# Patient Record
Sex: FEMALE | Race: WHITE | Employment: OTHER | URBAN - METROPOLITAN AREA
[De-identification: names, ages, dates, MRNs, and addresses within clinical notes are randomized per-mention and may not be internally consistent; named-entity substitution may affect disease eponyms.]

---

## 2020-11-12 RX ORDER — CONJUGATED ESTROGENS AND MEDROXYPROGESTERONE ACETATE .625; 2.5 MG/1; MG/1
TABLET, SUGAR COATED ORAL
Qty: 28 TAB | Refills: 0 | Status: SHIPPED | OUTPATIENT
Start: 2020-11-12 | End: 2020-12-03 | Stop reason: SDUPTHER

## 2020-11-27 VITALS
WEIGHT: 154.4 LBS | DIASTOLIC BLOOD PRESSURE: 72 MMHG | HEIGHT: 63 IN | SYSTOLIC BLOOD PRESSURE: 138 MMHG | BODY MASS INDEX: 27.36 KG/M2

## 2020-11-27 PROBLEM — M81.0 OSTEOPOROSIS: Status: ACTIVE | Noted: 2020-11-27

## 2020-11-27 PROBLEM — M85.80 OSTEOPENIA: Status: ACTIVE | Noted: 2020-11-27

## 2020-11-27 PROBLEM — Z78.0 MENOPAUSE: Status: ACTIVE | Noted: 2020-11-27

## 2020-11-27 RX ORDER — ASPIRIN 81 MG/1
TABLET ORAL DAILY
COMMUNITY

## 2020-12-03 ENCOUNTER — OFFICE VISIT (OUTPATIENT)
Dept: OBGYN CLINIC | Age: 70
End: 2020-12-03
Payer: MEDICARE

## 2020-12-03 VITALS — BODY MASS INDEX: 27.79 KG/M2 | HEIGHT: 63 IN

## 2020-12-03 VITALS
HEIGHT: 63 IN | DIASTOLIC BLOOD PRESSURE: 74 MMHG | SYSTOLIC BLOOD PRESSURE: 130 MMHG | WEIGHT: 157 LBS | BODY MASS INDEX: 27.82 KG/M2

## 2020-12-03 DIAGNOSIS — N95.1 MENOPAUSAL SYNDROME: ICD-10-CM

## 2020-12-03 DIAGNOSIS — Z12.4 SCREENING FOR MALIGNANT NEOPLASM OF THE CERVIX: Primary | ICD-10-CM

## 2020-12-03 PROCEDURE — G8419 CALC BMI OUT NRM PARAM NOF/U: HCPCS | Performed by: OBSTETRICS & GYNECOLOGY

## 2020-12-03 PROCEDURE — G0101 CA SCREEN;PELVIC/BREAST EXAM: HCPCS | Performed by: OBSTETRICS & GYNECOLOGY

## 2020-12-03 PROCEDURE — G8427 DOCREV CUR MEDS BY ELIG CLIN: HCPCS | Performed by: OBSTETRICS & GYNECOLOGY

## 2020-12-03 PROCEDURE — G8510 SCR DEP NEG, NO PLAN REQD: HCPCS | Performed by: OBSTETRICS & GYNECOLOGY

## 2020-12-03 PROCEDURE — G8536 NO DOC ELDER MAL SCRN: HCPCS | Performed by: OBSTETRICS & GYNECOLOGY

## 2020-12-03 RX ORDER — CONJUGATED ESTROGENS AND MEDROXYPROGESTERONE ACETATE .625; 2.5 MG/1; MG/1
1 TABLET, SUGAR COATED ORAL DAILY
Qty: 90 TAB | Refills: 3 | Status: SHIPPED | OUTPATIENT
Start: 2020-12-03 | End: 2021-12-06 | Stop reason: SDUPTHER

## 2020-12-03 NOTE — PROGRESS NOTES
Bailey Alberto is a No obstetric history on file. , 65 y.o. female   No LMP recorded. Patient is perimenopausal.    She presents for her annual    She is having no significant problems. Menstrual status:  Her periods are: menopause. Cycles are: menopause. She does not have dysmenorrhea. Medical conditions:  Since her last annual GYN exam about one year ago, she has not the following changes in her health history: none. Past Medical History:   Diagnosis Date    Menopause 11/27/2020    Obesity     Osteopenia 11/27/2020    Osteoporosis 11/27/2020     Past Surgical History:   Procedure Laterality Date    HX APPENDECTOMY         Prior to Admission medications    Medication Sig Start Date End Date Taking? Authorizing Provider   estrogen, conjugated,-medroxyPROGESTERone (Prempro) 0.625-2.5 mg per tablet Take 1 Tab by mouth daily. 12/3/20  Yes Erwin Manning MD   aspirin delayed-release 81 mg tablet Take  by mouth daily. Yes Provider, Historical   calcium carbonate (CALCIUM 500 PO) Take  by mouth. Yes Provider, Historical   cholecalciferol, vitamin D3, (VITAMIN D3 PO) Take  by mouth. Yes Provider, Historical       Allergies   Allergen Reactions    Penicillins Rash          Tobacco History:  reports that she has never smoked. She has never used smokeless tobacco.  Alcohol Abuse:  has no history on file for alcohol. Drug Abuse:  reports no history of drug use. Family Medical/Cancer History:   Family History   Problem Relation Age of Onset    Diabetes Other     Colon Cancer Other           Review of Systems   Constitutional: Negative for chills, fever, malaise/fatigue and weight loss. HENT: Negative for congestion, ear pain, sinus pain and tinnitus. Eyes: Negative for blurred vision and double vision. Respiratory: Negative for cough, shortness of breath and wheezing. Cardiovascular: Negative for chest pain and palpitations.    Gastrointestinal: Negative for abdominal pain, blood in stool, constipation, diarrhea, heartburn, nausea and vomiting. Genitourinary: Negative for dysuria, flank pain, frequency, hematuria and urgency. Musculoskeletal: Negative for joint pain and myalgias. Skin: Negative for itching and rash. Neurological: Negative for dizziness, weakness and headaches. Psychiatric/Behavioral: Negative for depression, memory loss and suicidal ideas. The patient is not nervous/anxious and does not have insomnia. All other systems reviewed and are negative. Physical Exam  Constitutional:       Appearance: Normal appearance. HENT:      Head: Normocephalic and atraumatic. Cardiovascular:      Rate and Rhythm: Normal rate. Heart sounds: Normal heart sounds. Pulmonary:      Effort: Pulmonary effort is normal.      Breath sounds: Normal breath sounds. Chest:      Breasts:         Right: Normal.         Left: Normal.   Abdominal:      General: Abdomen is flat. Palpations: Abdomen is soft. Genitourinary:     General: Normal vulva. Vagina: Normal.      Cervix: Normal.      Uterus: Normal.       Adnexa: Right adnexa normal and left adnexa normal.      Rectum: Normal.      Comments: PAP Obtained  Neurological:      Mental Status: She is alert. Psychiatric:         Mood and Affect: Mood normal.         Behavior: Behavior normal.         Thought Content: Thought content normal.          Visit Vitals  /74 (BP 1 Location: Right arm, BP Patient Position: Sitting)   Ht 5' 2.5\" (1.588 m)   Wt 157 lb (71.2 kg)   BMI 28.26 kg/m²         Assessment:  Diagnoses and all orders for this visit:    1. Screening for malignant neoplasm of the cervix  -     PAP, LB, RFX HPV QMXMH([id number])    2. Menopausal syndrome    Other orders  -     estrogen, conjugated,-medroxyPROGESTERone (Prempro) 0.625-2.5 mg per tablet; Take 1 Tab by mouth daily.         Plan:Questions addressed  Counseled re: diet, exercise, healthy lifestyle  Return for Annual  Rec annual mammogram        Follow-up and Dispositions    · Return for Mammogram, 1 yr annual, 1 yr mammo.

## 2020-12-09 LAB
CYTOLOGIST CVX/VAG CYTO: NORMAL
CYTOLOGY CVX/VAG DOC CYTO: NORMAL
DX ICD CODE: NORMAL
LABCORP, 190119: NORMAL
Lab: NORMAL
Lab: NORMAL
OTHER STN SPEC: NORMAL
STAT OF ADQ CVX/VAG CYTO-IMP: NORMAL

## 2021-03-22 ENCOUNTER — OFFICE VISIT (OUTPATIENT)
Dept: OBGYN CLINIC | Age: 71
End: 2021-03-22
Payer: MEDICARE

## 2021-03-22 DIAGNOSIS — Z12.31 VISIT FOR SCREENING MAMMOGRAM: Primary | ICD-10-CM

## 2021-03-22 PROCEDURE — 77067 SCR MAMMO BI INCL CAD: CPT | Performed by: OBSTETRICS & GYNECOLOGY

## 2021-12-06 ENCOUNTER — OFFICE VISIT (OUTPATIENT)
Dept: OBGYN CLINIC | Age: 71
End: 2021-12-06
Payer: MEDICARE

## 2021-12-06 VITALS
WEIGHT: 158 LBS | SYSTOLIC BLOOD PRESSURE: 144 MMHG | HEIGHT: 63 IN | DIASTOLIC BLOOD PRESSURE: 72 MMHG | BODY MASS INDEX: 28 KG/M2

## 2021-12-06 DIAGNOSIS — N95.1 MENOPAUSAL SYNDROME: Primary | ICD-10-CM

## 2021-12-06 PROCEDURE — 1090F PRES/ABSN URINE INCON ASSESS: CPT | Performed by: OBSTETRICS & GYNECOLOGY

## 2021-12-06 PROCEDURE — 3017F COLORECTAL CA SCREEN DOC REV: CPT | Performed by: OBSTETRICS & GYNECOLOGY

## 2021-12-06 PROCEDURE — G8427 DOCREV CUR MEDS BY ELIG CLIN: HCPCS | Performed by: OBSTETRICS & GYNECOLOGY

## 2021-12-06 PROCEDURE — 1101F PT FALLS ASSESS-DOCD LE1/YR: CPT | Performed by: OBSTETRICS & GYNECOLOGY

## 2021-12-06 PROCEDURE — 99213 OFFICE O/P EST LOW 20 MIN: CPT | Performed by: OBSTETRICS & GYNECOLOGY

## 2021-12-06 PROCEDURE — G8419 CALC BMI OUT NRM PARAM NOF/U: HCPCS | Performed by: OBSTETRICS & GYNECOLOGY

## 2021-12-06 PROCEDURE — G8536 NO DOC ELDER MAL SCRN: HCPCS | Performed by: OBSTETRICS & GYNECOLOGY

## 2021-12-06 PROCEDURE — G8432 DEP SCR NOT DOC, RNG: HCPCS | Performed by: OBSTETRICS & GYNECOLOGY

## 2021-12-06 PROCEDURE — G9899 SCRN MAM PERF RSLTS DOC: HCPCS | Performed by: OBSTETRICS & GYNECOLOGY

## 2021-12-06 RX ORDER — CONJUGATED ESTROGENS AND MEDROXYPROGESTERONE ACETATE .625; 2.5 MG/1; MG/1
1 TABLET, SUGAR COATED ORAL DAILY
Qty: 90 TABLET | Refills: 3 | Status: SHIPPED | OUTPATIENT
Start: 2021-12-06 | End: 2021-12-26

## 2021-12-06 NOTE — PROGRESS NOTES
Chief Complaint   Patient presents with    Medication Check     mammo-3-2021     Visit Vitals  BP (!) 144/72 (BP 1 Location: Left upper arm, BP Patient Position: Sitting, BP Cuff Size: Small adult)   Ht 5' 2.5\" (1.588 m)   Wt 158 lb (71.7 kg)   BMI 28.44 kg/m²

## 2021-12-06 NOTE — PROGRESS NOTES
Jose Jiménez is a No obstetric history on file. , 66 y.o. female   No LMP recorded. (Menstrual status: Menopause). She presents for her problem    She is having no significant problems. doing well on HRT      Menstrual status:  Cycles are menopausal.    Flow: absent. She does not have dysmenorrhea. Medical conditions:  Since her last annual GYN exam about one year ago, she has not the following changes in her health history: none. Mammogram History:    UNRULY Results (most recent):  Results from Office Visit encounter on 03/22/21    [facility] MAMMO BI SCREENING INCL CAD       DEXA Results (most recent):  No results found for this or any previous visit. Past Medical History:   Diagnosis Date    Menopause 11/27/2020    Obesity     Osteopenia 11/27/2020    Osteoporosis 11/27/2020     Past Surgical History:   Procedure Laterality Date    HX APPENDECTOMY         Prior to Admission medications    Medication Sig Start Date End Date Taking? Authorizing Provider   estrogen, conjugated,-medroxyPROGESTERone (Prempro) 0.625-2.5 mg per tablet Take 1 Tablet by mouth daily. 12/6/21  Yes Aleta Roy MD   aspirin delayed-release 81 mg tablet Take  by mouth daily. Yes Provider, Historical   calcium carbonate (CALCIUM 500 PO) Take  by mouth. Yes Provider, Historical   cholecalciferol, vitamin D3, (VITAMIN D3 PO) Take  by mouth. Yes Provider, Historical       Allergies   Allergen Reactions    Penicillins Rash          Tobacco History:  reports that she has never smoked. She has never used smokeless tobacco.  Alcohol Abuse:  has no history on file for alcohol use. Drug Abuse:  reports no history of drug use. Family Medical/Cancer History:   Family History   Problem Relation Age of Onset    Diabetes Other     Colon Cancer Other           Review of Systems   Constitutional: Negative for chills, fever, malaise/fatigue and weight loss.    HENT: Negative for congestion, ear pain, sinus pain and tinnitus. Eyes: Negative for blurred vision and double vision. Respiratory: Negative for cough, shortness of breath and wheezing. Cardiovascular: Negative for chest pain and palpitations. Gastrointestinal: Negative for abdominal pain, blood in stool, constipation, diarrhea, heartburn, nausea and vomiting. Genitourinary: Negative for dysuria, flank pain, frequency, hematuria and urgency. Musculoskeletal: Negative for joint pain and myalgias. Skin: Negative for itching and rash. Neurological: Negative for dizziness, weakness and headaches. Psychiatric/Behavioral: Negative for depression, memory loss and suicidal ideas. The patient is not nervous/anxious and does not have insomnia. Physical Exam  Constitutional:       Appearance: Normal appearance. HENT:      Head: Normocephalic and atraumatic. Cardiovascular:      Rate and Rhythm: Normal rate. Heart sounds: Normal heart sounds. Pulmonary:      Effort: Pulmonary effort is normal.      Breath sounds: Normal breath sounds. Abdominal:      General: Abdomen is flat. Palpations: Abdomen is soft. Neurological:      Mental Status: She is alert. Psychiatric:         Mood and Affect: Mood normal.         Behavior: Behavior normal.         Thought Content: Thought content normal.          Visit Vitals  BP (!) 144/72 (BP 1 Location: Left upper arm, BP Patient Position: Sitting, BP Cuff Size: Small adult)   Ht 5' 2.5\" (1.588 m)   Wt 158 lb (71.7 kg)   BMI 28.44 kg/m²         Assessment:   Diagnoses and all orders for this visit:    1. Menopausal syndrome    Other orders  -     estrogen, conjugated,-medroxyPROGESTERone (Prempro) 0.625-2.5 mg per tablet; Take 1 Tablet by mouth daily. Plan: Questions addressed  Counseled re: diet, exercise, healthy lifestyle  Return for Annual  Rec annual mammogram        Follow-up and Dispositions    · Return for Mammogram, 1 yr annual, 1 yr mammo.

## 2021-12-26 RX ORDER — CONJUGATED ESTROGENS AND MEDROXYPROGESTERONE ACETATE .625; 2.5 MG/1; MG/1
1 TABLET, SUGAR COATED ORAL DAILY
Qty: 84 TABLET | Refills: 1 | Status: SHIPPED | OUTPATIENT
Start: 2021-12-26

## 2022-03-19 PROBLEM — Z78.0 MENOPAUSE: Status: ACTIVE | Noted: 2020-11-27

## 2022-03-20 PROBLEM — M85.80 OSTEOPENIA: Status: ACTIVE | Noted: 2020-11-27

## 2022-03-20 PROBLEM — M81.0 OSTEOPOROSIS: Status: ACTIVE | Noted: 2020-11-27

## 2022-12-06 ENCOUNTER — OFFICE VISIT (OUTPATIENT)
Dept: OBGYN CLINIC | Age: 72
End: 2022-12-06
Payer: MEDICARE

## 2022-12-06 VITALS
DIASTOLIC BLOOD PRESSURE: 72 MMHG | SYSTOLIC BLOOD PRESSURE: 130 MMHG | BODY MASS INDEX: 26.78 KG/M2 | HEIGHT: 63 IN | WEIGHT: 151.13 LBS

## 2022-12-06 DIAGNOSIS — M81.0 OSTEOPOROSIS, UNSPECIFIED OSTEOPOROSIS TYPE, UNSPECIFIED PATHOLOGICAL FRACTURE PRESENCE: ICD-10-CM

## 2022-12-06 DIAGNOSIS — N95.1 MENOPAUSAL SYNDROME: ICD-10-CM

## 2022-12-06 DIAGNOSIS — Z12.4 SCREENING FOR MALIGNANT NEOPLASM OF THE CERVIX: Primary | ICD-10-CM

## 2022-12-06 DIAGNOSIS — N95.9 MENOPAUSAL PROBLEM: ICD-10-CM

## 2022-12-06 RX ORDER — CONJUGATED ESTROGENS AND MEDROXYPROGESTERONE ACETATE .625; 2.5 MG/1; MG/1
1 TABLET, SUGAR COATED ORAL DAILY
Qty: 84 TABLET | Refills: 3 | Status: SHIPPED | OUTPATIENT
Start: 2022-12-06

## 2022-12-06 NOTE — PROGRESS NOTES
Irving Ardon is a No obstetric history on file. , 74 y.o. female   No LMP recorded. (Menstrual status: Menopause). She presents for her annual    She is having no significant problems. Menstrual status:  Cycles are menopausal.    Flow: absent. She does not have dysmenorrhea. Medical conditions:  Since her last annual GYN exam about one year ago, she has not the following changes in her health history: none. Mammogram History:    [facility] Results (most recent):  Results from Appointment encounter on 03/23/22    [facility] 3D DEMETRIUS W MAMMO BI SCREENING INCL CAD    Narrative  Bilateral digital screening mammogram with CAD and demetrius    HISTORY: Asymptomatic    COMPARISON: Mammograms dated 3/22/2021, 2/27/2020, 10/18/2018, 10/13/2017,  9/29/2016, 9/22/2015. Lifetime Alice risk assessment: 2.87%. The breast parenchyma is fatty replaced. No mass or grouped calcifications or  architectural distortion in either breast. No change from prior studies. Impression  No findings to implicate malignancy in either breast.    BI-RADS code 1: Negative    BI-RADS follow-up code 1: Recommend one-year follow-up mammogram.       DEXA Results (most recent):  No results found for this or any previous visit. Past Medical History:   Diagnosis Date    Menopause 11/27/2020    Obesity     Osteopenia 11/27/2020    Osteoporosis 11/27/2020     Past Surgical History:   Procedure Laterality Date    HX APPENDECTOMY         Prior to Admission medications    Medication Sig Start Date End Date Taking? Authorizing Provider   estrogen, conjugated,-medroxyPROGESTERone (Prempro) 0.625-2.5 mg per tablet Take 1 Tablet by mouth daily. 12/6/22  Yes Jimmy Nicolas MD   aspirin delayed-release 81 mg tablet Take  by mouth daily. Yes Provider, Historical   calcium carbonate (CALCIUM 500 PO) Take  by mouth. Yes Provider, Historical   cholecalciferol, vitamin D3, (VITAMIN D3 PO) Take  by mouth.    Yes Provider, Historical       Allergies Allergen Reactions    Penicillins Rash          Tobacco History:  reports that she has never smoked. She has never used smokeless tobacco.  Alcohol use:  has no history on file for alcohol use. Drug use:  reports no history of drug use. Family Medical/Cancer History:   Family History   Problem Relation Age of Onset    Diabetes Other     Colon Cancer Other           Review of Systems   Constitutional:  Negative for chills, fever, malaise/fatigue and weight loss. HENT:  Negative for congestion, ear pain, sinus pain and tinnitus. Eyes:  Negative for blurred vision and double vision. Respiratory:  Negative for cough, shortness of breath and wheezing. Cardiovascular:  Negative for chest pain and palpitations. Gastrointestinal:  Negative for abdominal pain, blood in stool, constipation, diarrhea, heartburn, nausea and vomiting. Genitourinary:  Negative for dysuria, flank pain, frequency, hematuria and urgency. Musculoskeletal:  Negative for joint pain and myalgias. Skin:  Negative for itching and rash. Neurological:  Negative for dizziness, weakness and headaches. Psychiatric/Behavioral:  Negative for depression, memory loss and suicidal ideas. The patient is not nervous/anxious and does not have insomnia. Physical Exam  Constitutional:       Appearance: Normal appearance. HENT:      Head: Normocephalic and atraumatic. Cardiovascular:      Rate and Rhythm: Normal rate. Heart sounds: Normal heart sounds. Pulmonary:      Effort: Pulmonary effort is normal.      Breath sounds: Normal breath sounds. Chest:   Breasts:     Right: Normal.      Left: Normal.   Abdominal:      General: Abdomen is flat. Palpations: Abdomen is soft. Genitourinary:     General: Normal vulva.       Vagina: Normal.      Cervix: Normal.      Uterus: Normal.       Adnexa: Right adnexa normal and left adnexa normal.      Rectum: Normal.      Comments: PAP Obtained  Urethra normal  Atrophy present  Neurological:      Mental Status: She is alert. Psychiatric:         Mood and Affect: Mood normal.         Behavior: Behavior normal.         Thought Content: Thought content normal.        Visit Vitals  /72 (BP 1 Location: Right arm, BP Patient Position: Sitting, BP Cuff Size: Small adult)   Ht 5' 2.5\" (1.588 m)   Wt 151 lb 2 oz (68.5 kg)   BMI 27.20 kg/m²         Assessment: Diagnoses and all orders for this visit:    1. Screening for malignant neoplasm of the cervix  -     PAP IG, RFX APTIMA HPV ASCUS ([id number])    2. Menopausal problem  -     DEXA BONE DENSITY STUDY AXIAL; Future    3. Osteoporosis, unspecified osteoporosis type, unspecified pathological fracture presence    4. Menopausal syndrome    Other orders  -     estrogen, conjugated,-medroxyPROGESTERone (Prempro) 0.625-2.5 mg per tablet; Take 1 Tablet by mouth daily.       Plan: Questions addressed  Counseled re: diet, exercise, healthy lifestyle  Return for Annual  Rec annual mammogram        Follow-up and Dispositions    Return for Mammogram, 1 yr annual.

## 2022-12-06 NOTE — PROGRESS NOTES
Chief Complaint   Patient presents with    Annual Exam     Mammo-3-23-22     Visit Vitals  /72 (BP 1 Location: Right arm, BP Patient Position: Sitting, BP Cuff Size: Small adult)   Ht 5' 2.5\" (1.588 m)   Wt 151 lb 2 oz (68.5 kg)   BMI 27.20 kg/m²

## 2022-12-07 LAB
CYTOLOGIST CVX/VAG CYTO: NORMAL
CYTOLOGY CVX/VAG DOC CYTO: NORMAL
CYTOLOGY CVX/VAG DOC THIN PREP: NORMAL
DX ICD CODE: NORMAL
LABCORP, 190119: NORMAL
Lab: NORMAL
OTHER STN SPEC: NORMAL
STAT OF ADQ CVX/VAG CYTO-IMP: NORMAL

## 2023-01-07 DIAGNOSIS — N95.9 MENOPAUSAL PROBLEM: ICD-10-CM

## 2023-01-11 NOTE — PROGRESS NOTES
DEXA: Osteoporosis in hips: T-score: -2.6  Options DWP  Desires to take Ca/Vit. D/dec. fall rsiks  Rescan in 2 yrs

## 2023-05-16 RX ORDER — ESTROGEN,CON/M-PROGEST ACET 0.625-2.5
1 TABLET ORAL DAILY
COMMUNITY
Start: 2022-12-06

## 2023-05-16 RX ORDER — ASPIRIN 81 MG/1
TABLET ORAL DAILY
COMMUNITY

## 2023-11-06 ENCOUNTER — TELEPHONE (OUTPATIENT)
Age: 73
End: 2023-11-06

## 2023-11-06 NOTE — TELEPHONE ENCOUNTER
Left a voicemail on 11/06 at 9:28 AM. Needs to schedule an annual exam Appointment       I returned the patients call and left a voicemail on 11/06 at 9:52 AM.

## 2023-11-13 RX ORDER — ESTROGEN,CON/M-PROGEST ACET 0.625-2.5
1 TABLET ORAL DAILY
Qty: 84 TABLET | Refills: 0 | Status: SHIPPED | OUTPATIENT
Start: 2023-11-13 | End: 2023-12-15 | Stop reason: SDUPTHER

## 2023-12-15 ENCOUNTER — OFFICE VISIT (OUTPATIENT)
Age: 73
End: 2023-12-15
Payer: MEDICARE

## 2023-12-15 VITALS
WEIGHT: 159.13 LBS | BODY MASS INDEX: 28.2 KG/M2 | HEIGHT: 63 IN | SYSTOLIC BLOOD PRESSURE: 126 MMHG | DIASTOLIC BLOOD PRESSURE: 76 MMHG

## 2023-12-15 DIAGNOSIS — N95.1 MENOPAUSAL SYNDROME: Primary | ICD-10-CM

## 2023-12-15 DIAGNOSIS — M81.0 AGE-RELATED OSTEOPOROSIS WITHOUT CURRENT PATHOLOGICAL FRACTURE: ICD-10-CM

## 2023-12-15 PROCEDURE — 1123F ACP DISCUSS/DSCN MKR DOCD: CPT | Performed by: OBSTETRICS & GYNECOLOGY

## 2023-12-15 PROCEDURE — 99213 OFFICE O/P EST LOW 20 MIN: CPT | Performed by: OBSTETRICS & GYNECOLOGY

## 2023-12-15 RX ORDER — ESTROGEN,CON/M-PROGEST ACET 0.625-2.5
1 TABLET ORAL DAILY
Qty: 84 TABLET | Refills: 3 | Status: SHIPPED | OUTPATIENT
Start: 2023-12-15

## 2024-12-15 DIAGNOSIS — N95.1 MENOPAUSAL SYNDROME: ICD-10-CM

## 2024-12-16 RX ORDER — ESTROGEN,CON/M-PROGEST ACET 0.625-2.5
1 TABLET ORAL DAILY
Qty: 84 TABLET | Refills: 3 | Status: SHIPPED | OUTPATIENT
Start: 2024-12-16 | End: 2024-12-20 | Stop reason: SDUPTHER

## 2024-12-20 ENCOUNTER — OFFICE VISIT (OUTPATIENT)
Age: 74
End: 2024-12-20
Payer: MEDICARE

## 2024-12-20 VITALS
BODY MASS INDEX: 26.98 KG/M2 | DIASTOLIC BLOOD PRESSURE: 76 MMHG | SYSTOLIC BLOOD PRESSURE: 132 MMHG | WEIGHT: 152.25 LBS | HEIGHT: 63 IN

## 2024-12-20 DIAGNOSIS — Z12.4 PAP SMEAR FOR CERVICAL CANCER SCREENING: ICD-10-CM

## 2024-12-20 DIAGNOSIS — Z12.31 SCREENING MAMMOGRAM FOR BREAST CANCER: Primary | ICD-10-CM

## 2024-12-20 DIAGNOSIS — Z01.419 GYNECOLOGIC EXAM NORMAL: ICD-10-CM

## 2024-12-20 DIAGNOSIS — N95.1 MENOPAUSAL SYNDROME: ICD-10-CM

## 2024-12-20 PROCEDURE — G0101 CA SCREEN;PELVIC/BREAST EXAM: HCPCS | Performed by: OBSTETRICS & GYNECOLOGY

## 2024-12-20 PROCEDURE — G8427 DOCREV CUR MEDS BY ELIG CLIN: HCPCS | Performed by: OBSTETRICS & GYNECOLOGY

## 2024-12-20 PROCEDURE — G8419 CALC BMI OUT NRM PARAM NOF/U: HCPCS | Performed by: OBSTETRICS & GYNECOLOGY

## 2024-12-20 RX ORDER — ESTROGEN,CON/M-PROGEST ACET 0.625-2.5
1 TABLET ORAL DAILY
Qty: 84 TABLET | Refills: 3 | Status: SHIPPED | OUTPATIENT
Start: 2024-12-20 | End: 2024-12-20

## 2024-12-20 RX ORDER — ESTROGEN,CON/M-PROGEST ACET 0.625-2.5
1 TABLET ORAL DAILY
Qty: 84 TABLET | Refills: 3 | Status: SHIPPED | OUTPATIENT
Start: 2025-01-06

## 2024-12-20 ASSESSMENT — ENCOUNTER SYMPTOMS
RESPIRATORY NEGATIVE: 1
GASTROINTESTINAL NEGATIVE: 1

## 2024-12-20 NOTE — PROGRESS NOTES
Nitza Lovell is a No obstetric history on file., 74 y.o. female   No LMP recorded. (Menstrual status: Menopause).    She presents for her annual    She is having no significant problems.      Menstrual status:  Cycles are menopausal.    Flow: absent.      She does not have dysmenorrhea.      Medical conditions:  Since her last annual GYN exam about one year ago, she has not the following changes in her health history: none.     Mammogram History:    JANICE Results (most recent):  @[facility]([id number])@     DEXA Results (most recent):  @[facility]([id number])@       Past Medical History:   Diagnosis Date    Menopause 11/27/2020    Obesity     Osteopenia 11/27/2020    Osteoporosis 11/27/2020     Past Surgical History:   Procedure Laterality Date    APPENDECTOMY         Prior to Admission medications    Medication Sig Start Date End Date Taking? Authorizing Provider   estrogen, conjugated,-medroxyPROGESTERone (PREMPRO) 0.625-2.5 MG per tablet Take 1 tablet by mouth daily 1/6/25  Yes Kilo Waterman MD   aspirin 81 MG EC tablet Take by mouth daily   Yes Automatic Reconciliation, Ar       Allergies   Allergen Reactions    Penicillins Rash          Tobacco History:  reports that she has never smoked. She has never used smokeless tobacco.  Alcohol use:  has no history on file for alcohol use.  Drug use:  reports no history of drug use.    Family Medical/Cancer History:   Family History   Problem Relation Age of Onset    Colon Cancer Other     Diabetes Other         Review of Systems   Constitutional: Negative.    Respiratory: Negative.     Cardiovascular: Negative.    Gastrointestinal: Negative.    Genitourinary: Negative.    Musculoskeletal: Negative.    Neurological: Negative.    Psychiatric/Behavioral: Negative.           /76 (Site: Right Upper Arm, Position: Sitting, Cuff Size: Small Adult)   Ht 1.588 m (5' 2.5\")   Wt 69.1 kg (152 lb 4 oz)   BMI 27.40 kg/m²     Physical Exam  Constitutional:

## 2024-12-20 NOTE — PROGRESS NOTES
Chief Complaint   Patient presents with    Annual Exam     Mammo-3-24-23. Pharmacy is changing 1-1-25 to [facility] in [facility]       /76 (Site: Right Upper Arm, Position: Sitting, Cuff Size: Small Adult)   Ht 1.588 m (5' 2.5\")   Wt 69.1 kg (152 lb 4 oz)   BMI 27.40 kg/m²

## 2024-12-27 LAB
., LABCORP: NORMAL
CYTOLOGIST CVX/VAG CYTO: NORMAL
CYTOLOGY CVX/VAG DOC CYTO: NORMAL
CYTOLOGY CVX/VAG DOC THIN PREP: NORMAL
DX ICD CODE: NORMAL
Lab: NORMAL
OTHER STN SPEC: NORMAL
STAT OF ADQ CVX/VAG CYTO-IMP: NORMAL

## 2025-04-16 DIAGNOSIS — Z12.31 SCREENING MAMMOGRAM FOR BREAST CANCER: ICD-10-CM
